# Patient Record
Sex: FEMALE | NOT HISPANIC OR LATINO | URBAN - METROPOLITAN AREA
[De-identification: names, ages, dates, MRNs, and addresses within clinical notes are randomized per-mention and may not be internally consistent; named-entity substitution may affect disease eponyms.]

---

## 2021-02-09 ENCOUNTER — APPOINTMENT (RX ONLY)
Dept: URBAN - METROPOLITAN AREA CLINIC 377 | Facility: CLINIC | Age: 36
Setting detail: DERMATOLOGY
End: 2021-02-09

## 2021-02-09 DIAGNOSIS — L30.0 NUMMULAR DERMATITIS: ICD-10-CM

## 2021-02-09 DIAGNOSIS — Q82.5 CONGENITAL NON-NEOPLASTIC NEVUS: ICD-10-CM

## 2021-02-09 DIAGNOSIS — D22 MELANOCYTIC NEVI: ICD-10-CM

## 2021-02-09 PROBLEM — D22.39 MELANOCYTIC NEVI OF OTHER PARTS OF FACE: Status: ACTIVE | Noted: 2021-02-09

## 2021-02-09 PROCEDURE — 99203 OFFICE O/P NEW LOW 30 MIN: CPT

## 2021-02-09 PROCEDURE — ? MEDICATION COUNSELING

## 2021-02-09 PROCEDURE — ? PRESCRIPTION SAMPLES PROVIDED

## 2021-02-09 PROCEDURE — ? TREATMENT REGIMEN

## 2021-02-09 PROCEDURE — ? COUNSELING

## 2021-02-09 PROCEDURE — ? PRESCRIPTION

## 2021-02-09 PROCEDURE — ? ADDITIONAL NOTES

## 2021-02-09 RX ORDER — TRIAMCINOLONE ACETONIDE 1 MG/G
CREAM TOPICAL BID
Qty: 1 | Refills: 1 | Status: ERX | COMMUNITY
Start: 2021-02-09

## 2021-02-09 RX ADMIN — TRIAMCINOLONE ACETONIDE: 1 CREAM TOPICAL at 00:00

## 2021-02-09 ASSESSMENT — LOCATION ZONE DERM
LOCATION ZONE: ARM
LOCATION ZONE: FACE
LOCATION ZONE: NECK

## 2021-02-09 ASSESSMENT — LOCATION DETAILED DESCRIPTION DERM
LOCATION DETAILED: RIGHT MID PREAURICULAR CHEEK
LOCATION DETAILED: LEFT ANTERIOR SHOULDER
LOCATION DETAILED: LEFT INFERIOR ANTERIOR NECK
LOCATION DETAILED: RIGHT INFERIOR FOREHEAD
LOCATION DETAILED: RIGHT INFERIOR PREAURICULAR CHEEK

## 2021-02-09 ASSESSMENT — LOCATION SIMPLE DESCRIPTION DERM
LOCATION SIMPLE: RIGHT FOREHEAD
LOCATION SIMPLE: LEFT SHOULDER
LOCATION SIMPLE: LEFT ANTERIOR NECK
LOCATION SIMPLE: RIGHT CHEEK

## 2021-02-09 NOTE — HPI: MOLE REMOVAL
How Severe Are Your Moles?: mild
Has Your Mole Been Treated?: not been treated
Is This A New Presentation, Or A Follow-Up?: Mole Removal
Additional History: Request removal

## 2021-02-09 NOTE — HPI: SKIN LESION (BIRTHMARK)
How Severe Is Your Birthmark?: mild
Is This A New Presentation, Or A Follow-Up?: Skin Lesion
Additional History: “Birth anirudh” on right ear. Pt interested in cosmetic removal

## 2021-02-09 NOTE — PROCEDURE: ADDITIONAL NOTES
Additional Notes: May use Vaseline or aquaphor ointment to eyelids QHS PRN
Render Risk Assessment In Note?: yes
Detail Level: Simple
Additional Notes: Advised plastics if patients wants removal
Additional Notes: Clinically appearing benign. Recommend consult with Plastic Surgeon for treatment due to location of lesion.
Patient Management Risk Assessment: Low

## 2021-02-09 NOTE — PROCEDURE: MEDICATION COUNSELING
Xelseymourz Pregnancy And Lactation Text: This medication is Pregnancy Category D and is not considered safe during pregnancy.  The risk during breast feeding is also uncertain.

## 2021-02-09 NOTE — HPI: RASH
What Type Of Note Output Would You Prefer (Optional)?: Bullet Format
How Severe Is Your Rash?: mild
Is This A New Presentation, Or A Follow-Up?: Rash
Additional History: Rash, mild very dry skin. Did not use lotions until after. Now using jergens. Uses free& clear detergent. Wash with herbal essence body wash many years

## 2021-02-09 NOTE — PROCEDURE: TREATMENT REGIMEN
Detail Level: Zone
Plan: Advise common in winter dry months. Patient states she does not apply moisturizer. Recommend hypoallergenic fragrance free moisturizing creams for patient to use TID to help keep skin hydrated. Dove sensitive skin body wash. Apply Triamcinolone cream to active lesions BID X 2 weeks on 1 week off. RTC 4 weeks for recheck to assess improvement.
Initiate Treatment: Triamcinolone cream apply BID x2 weeks on 1 week off Repeat PRN